# Patient Record
Sex: FEMALE | Race: WHITE | NOT HISPANIC OR LATINO | URBAN - METROPOLITAN AREA
[De-identification: names, ages, dates, MRNs, and addresses within clinical notes are randomized per-mention and may not be internally consistent; named-entity substitution may affect disease eponyms.]

---

## 2017-05-03 ENCOUNTER — OUTPATIENT (OUTPATIENT)
Dept: OUTPATIENT SERVICES | Facility: HOSPITAL | Age: 32
LOS: 1 days | End: 2017-05-03

## 2017-05-16 ENCOUNTER — INPATIENT (INPATIENT)
Facility: HOSPITAL | Age: 32
LOS: 2 days | Discharge: HOME | End: 2017-05-19
Payer: COMMERCIAL

## 2017-05-16 PROCEDURE — 76815 OB US LIMITED FETUS(S): CPT | Mod: 26

## 2018-05-17 PROBLEM — Z00.00 ENCOUNTER FOR PREVENTIVE HEALTH EXAMINATION: Status: ACTIVE | Noted: 2018-05-17

## 2018-05-18 ENCOUNTER — RX RENEWAL (OUTPATIENT)
Age: 33
End: 2018-05-18

## 2018-05-31 ENCOUNTER — RX RENEWAL (OUTPATIENT)
Age: 33
End: 2018-05-31

## 2018-06-01 RX ORDER — DROSPIRENONE AND ETHINYL ESTRADIOL 0.03MG-3MG
3-0.03 KIT ORAL DAILY
Qty: 90 | Refills: 2 | Status: ACTIVE | COMMUNITY
Start: 2018-05-18

## 2019-04-08 ENCOUNTER — APPOINTMENT (OUTPATIENT)
Dept: OBGYN | Facility: CLINIC | Age: 34
End: 2019-04-08
Payer: COMMERCIAL

## 2019-04-08 VITALS
SYSTOLIC BLOOD PRESSURE: 104 MMHG | DIASTOLIC BLOOD PRESSURE: 60 MMHG | HEIGHT: 70.5 IN | BODY MASS INDEX: 22.22 KG/M2 | WEIGHT: 157 LBS

## 2019-04-08 DIAGNOSIS — Z78.9 OTHER SPECIFIED HEALTH STATUS: ICD-10-CM

## 2019-04-08 DIAGNOSIS — Z86.39 PERSONAL HISTORY OF OTHER ENDOCRINE, NUTRITIONAL AND METABOLIC DISEASE: ICD-10-CM

## 2019-04-08 DIAGNOSIS — R53.82 CHRONIC FATIGUE, UNSPECIFIED: ICD-10-CM

## 2019-04-08 DIAGNOSIS — Z87.440 PERSONAL HISTORY OF URINARY (TRACT) INFECTIONS: ICD-10-CM

## 2019-04-08 PROCEDURE — 99395 PREV VISIT EST AGE 18-39: CPT

## 2019-04-08 RX ORDER — DROSPIRENONE AND ETHINYL ESTRADIOL 0.03MG-3MG
KIT ORAL
Refills: 0 | Status: ACTIVE | COMMUNITY

## 2019-04-08 NOTE — DISCUSSION/SUMMARY
[FreeTextEntry1] : a33 white female came to the office for annual exam patient is feeling well physical pelvic exam within normal limits Pap smear done patient is leaning of chronic fatigue with some blood work and managed accordingly I spent a 55 minutes the

## 2019-04-10 LAB
ESTIMATED AVERAGE GLUCOSE: 108 MG/DL
HBA1C MFR BLD HPLC: 5.4 %
HPV HIGH+LOW RISK DNA PNL CVX: NOT DETECTED
TSH SERPL-ACNC: 2.68 UIU/ML

## 2019-04-15 LAB
ANA SER IF-ACNC: NEGATIVE
CYTOLOGY CVX/VAG DOC THIN PREP: NORMAL

## 2019-08-12 ENCOUNTER — RESULT REVIEW (OUTPATIENT)
Age: 34
End: 2019-08-12

## 2020-05-20 ENCOUNTER — APPOINTMENT (OUTPATIENT)
Dept: OBGYN | Facility: CLINIC | Age: 35
End: 2020-05-20

## 2020-06-29 ENCOUNTER — APPOINTMENT (OUTPATIENT)
Dept: OBGYN | Facility: CLINIC | Age: 35
End: 2020-06-29
Payer: COMMERCIAL

## 2020-06-29 VITALS
HEIGHT: 70 IN | BODY MASS INDEX: 22.62 KG/M2 | SYSTOLIC BLOOD PRESSURE: 100 MMHG | DIASTOLIC BLOOD PRESSURE: 70 MMHG | WEIGHT: 158 LBS | TEMPERATURE: 98.7 F

## 2020-06-29 DIAGNOSIS — Z78.9 OTHER SPECIFIED HEALTH STATUS: ICD-10-CM

## 2020-06-29 PROCEDURE — 99395 PREV VISIT EST AGE 18-39: CPT

## 2020-06-29 RX ORDER — DROSPIRENONE AND ETHINYL ESTRADIOL 0.03MG-3MG
3-0.03 KIT ORAL DAILY
Qty: 3 | Refills: 3 | Status: ACTIVE | COMMUNITY
Start: 2019-04-08 | End: 1900-01-01

## 2020-06-29 NOTE — CHIEF COMPLAINT
[FreeTextEntry1] : 34 y/o P1 for annual exam without complaint\par LASt pap was NILM but she wants one annually\par Not sexually active but wants all STI testing\par Wants an ocp refill\par  [Annual Visit] : annual visit

## 2020-07-01 LAB
C TRACH RRNA SPEC QL NAA+PROBE: NOT DETECTED
HBV SURFACE AG SER QL: NONREACTIVE
HCV AB SER QL: NONREACTIVE
HCV S/CO RATIO: 0.09 S/CO
HIV1+2 AB SPEC QL IA.RAPID: NONREACTIVE
HPV HIGH+LOW RISK DNA PNL CVX: NOT DETECTED
N GONORRHOEA RRNA SPEC QL NAA+PROBE: NOT DETECTED
SOURCE TP AMPLIFICATION: NORMAL

## 2020-07-06 LAB — CYTOLOGY CVX/VAG DOC THIN PREP: NORMAL

## 2021-12-29 ENCOUNTER — NON-APPOINTMENT (OUTPATIENT)
Age: 36
End: 2021-12-29

## 2021-12-30 ENCOUNTER — APPOINTMENT (OUTPATIENT)
Dept: OBGYN | Facility: CLINIC | Age: 36
End: 2021-12-30
Payer: COMMERCIAL

## 2021-12-30 ENCOUNTER — LABORATORY RESULT (OUTPATIENT)
Age: 36
End: 2021-12-30

## 2021-12-30 VITALS
DIASTOLIC BLOOD PRESSURE: 84 MMHG | HEIGHT: 70 IN | BODY MASS INDEX: 21.47 KG/M2 | SYSTOLIC BLOOD PRESSURE: 120 MMHG | WEIGHT: 150 LBS

## 2021-12-30 DIAGNOSIS — Z11.3 ENCOUNTER FOR SCREENING FOR INFECTIONS WITH A PREDOMINANTLY SEXUAL MODE OF TRANSMISSION: ICD-10-CM

## 2021-12-30 PROCEDURE — 81025 URINE PREGNANCY TEST: CPT

## 2021-12-30 PROCEDURE — 36415 COLL VENOUS BLD VENIPUNCTURE: CPT

## 2021-12-30 PROCEDURE — 99395 PREV VISIT EST AGE 18-39: CPT

## 2021-12-30 NOTE — DISCUSSION/SUMMARY
[FreeTextEntry1] : ELVIRA GUTIERRES is a 36 year G P here for WWE, normal exam\par - pap/hpv, GC/CH, sti panel sent\par - SBE reviewed\par - Birth Control discussed - keshia sent - no abs c/i\par - Follow up in 1 year for annual or PRN\par

## 2021-12-30 NOTE — HISTORY OF PRESENT ILLNESS
[FreeTextEntry1] : 36 yr old  here for annual also requesting ocps and sti screening. Iris has no symptoms but would like screening every year and pap smears every year. Denies vb pain or discharge. sexually active, using condoms right now but desires to go back on keshia. \par \par gynhx: reg menses; denies sti/fibroids/cysts; remote h/o abn pap, most recent in  - nilm, hpv neg\par obhx:  x1, 10 lb, [facility]

## 2022-01-02 LAB
C TRACH RRNA SPEC QL NAA+PROBE: NOT DETECTED
HBV SURFACE AG SER QL: NONREACTIVE
HCV AB SER QL: NONREACTIVE
HCV S/CO RATIO: 0.09 S/CO
HIV1+2 AB SPEC QL IA.RAPID: NONREACTIVE
HPV HIGH+LOW RISK DNA PNL CVX: NOT DETECTED
HSV 1+2 IGG SER IA-IMP: NEGATIVE
HSV 1+2 IGG SER IA-IMP: NEGATIVE
HSV1 IGG SER QL: 0.04 INDEX
HSV2 IGG SER QL: 0.15 INDEX
N GONORRHOEA RRNA SPEC QL NAA+PROBE: NOT DETECTED
SOURCE TP AMPLIFICATION: NORMAL
T PALLIDUM AB SER QL IA: NEGATIVE

## 2022-01-14 LAB
CYTOLOGY CVX/VAG DOC THIN PREP: ABNORMAL
HSV1 IGM SER QL: NEGATIVE
HSV2 AB FLD-ACNC: NEGATIVE

## 2023-01-19 ENCOUNTER — RX RENEWAL (OUTPATIENT)
Age: 38
End: 2023-01-19

## 2023-02-09 ENCOUNTER — APPOINTMENT (OUTPATIENT)
Dept: OBGYN | Facility: CLINIC | Age: 38
End: 2023-02-09
Payer: COMMERCIAL

## 2023-02-09 VITALS
SYSTOLIC BLOOD PRESSURE: 125 MMHG | BODY MASS INDEX: 22.48 KG/M2 | HEIGHT: 70 IN | WEIGHT: 157 LBS | DIASTOLIC BLOOD PRESSURE: 73 MMHG

## 2023-02-09 DIAGNOSIS — Z01.419 ENCOUNTER FOR GYNECOLOGICAL EXAMINATION (GENERAL) (ROUTINE) W/OUT ABNORMAL FINDINGS: ICD-10-CM

## 2023-02-09 PROCEDURE — 99395 PREV VISIT EST AGE 18-39: CPT

## 2023-02-09 PROCEDURE — 36415 COLL VENOUS BLD VENIPUNCTURE: CPT

## 2023-02-09 NOTE — DISCUSSION/SUMMARY
[FreeTextEntry1] : ELVIRA GUTIERRES is a 36 year  here for WWE, normal exam\par - pap/hpv, GC/CH, sti panel sent - desires every year\par - SBE reviewed\par - Birth Control discussed - keshia refill sent - no abs c/i\par - Follow up in 1 year for annual or PRN\par - tsh sent - h/o hypothyroid

## 2023-02-09 NOTE — HISTORY OF PRESENT ILLNESS
[FreeTextEntry1] : 37 yr old  here for annual also requesting ocps and sti screening. Iris has no symptoms but would like screening every year and pap smears every year. Denies vb pain or discharge. sexually active.\par \par bc: keshia\par \par gynhx: reg menses; denies sti/fibroids/cysts; remote h/o abn pap, most recent in  - nilm, hpv neg\par obhx:  x1, 10 lb, [facility]

## 2023-02-13 ENCOUNTER — TRANSCRIPTION ENCOUNTER (OUTPATIENT)
Age: 38
End: 2023-02-13

## 2023-02-13 LAB
C TRACH RRNA SPEC QL NAA+PROBE: NOT DETECTED
HBV SURFACE AG SER QL: NONREACTIVE
HCV AB SER QL: NONREACTIVE
HCV S/CO RATIO: 0.06 S/CO
HIV1+2 AB SPEC QL IA.RAPID: NONREACTIVE
HPV HIGH+LOW RISK DNA PNL CVX: NOT DETECTED
N GONORRHOEA RRNA SPEC QL NAA+PROBE: NOT DETECTED
SOURCE TP AMPLIFICATION: NORMAL
T PALLIDUM AB SER QL IA: NEGATIVE
TSH SERPL-ACNC: 1.93 UIU/ML

## 2023-02-15 LAB — CYTOLOGY CVX/VAG DOC THIN PREP: NORMAL

## 2023-02-16 ENCOUNTER — TRANSCRIPTION ENCOUNTER (OUTPATIENT)
Age: 38
End: 2023-02-16

## 2024-03-27 ENCOUNTER — APPOINTMENT (OUTPATIENT)
Dept: OBGYN | Facility: CLINIC | Age: 39
End: 2024-03-27
Payer: COMMERCIAL

## 2024-03-27 VITALS
HEIGHT: 70 IN | SYSTOLIC BLOOD PRESSURE: 110 MMHG | DIASTOLIC BLOOD PRESSURE: 73 MMHG | BODY MASS INDEX: 22.9 KG/M2 | WEIGHT: 160 LBS

## 2024-03-27 LAB
HCG UR QL: NEGATIVE
QUALITY CONTROL: YES

## 2024-03-27 PROCEDURE — 99395 PREV VISIT EST AGE 18-39: CPT

## 2024-03-27 PROCEDURE — 81025 URINE PREGNANCY TEST: CPT

## 2024-03-27 NOTE — HISTORY OF PRESENT ILLNESS
[Patient reported PAP Smear was normal] : Patient reported PAP Smear was normal [HIV test declined] : HIV test declined [Syphilis test declined] : Syphilis test declined [Gonorrhea test declined] : Gonorrhea test declined [Chlamydia test declined] : Chlamydia test declined [Trichomonas test declined] : Trichomonas test declined [HPV test offered] : HPV test offered [Hepatitis B test declined] : Hepatitis B test declined [Hepatitis C test declined] : Hepatitis C test declined [N] : Patient reports normal menses [Oral Contraceptive] : uses oral contraception pills [Y] : Patient is sexually active [TextBox_4] : 38 yr old  here for annual also requesting ocps refill.  She reports no adverse effects to OCPs.  Declines STI screening today.Denies vb pain or discharge. sexually active. [PapSmeardate] : 2/23 [LMPDate] : 2/22/24 [PGxTotal] : 1 [[facility] HospitalxFulerm] : 1 [[facility] Surpriseiving] : 1

## 2024-03-27 NOTE — PLAN
[FreeTextEntry1] : Unremarkable CBE and pelvic exam SBE reviewed Mammo ordered Pap and HPV collected Healthy diet, exercise, sleep hygiene discussed OCPs renewed Patient to contact provider with any of the following warning signs of hormonal contraceptives: Abdominal pain CHest pain Headaches Visual Changes Shortness of breath Pain or swelling in limbs No other gyn concerns today RTO x 1 year or prn

## 2024-03-28 LAB — HPV HIGH+LOW RISK DNA PNL CVX: NOT DETECTED

## 2024-04-01 LAB — CYTOLOGY CVX/VAG DOC THIN PREP: NORMAL

## 2024-04-12 ENCOUNTER — APPOINTMENT (OUTPATIENT)
Dept: MAMMOGRAPHY | Facility: CLINIC | Age: 39
End: 2024-04-12
Payer: COMMERCIAL

## 2024-04-12 ENCOUNTER — RESULT REVIEW (OUTPATIENT)
Age: 39
End: 2024-04-12

## 2024-04-12 PROCEDURE — 77067 SCR MAMMO BI INCL CAD: CPT

## 2024-04-12 PROCEDURE — 77063 BREAST TOMOSYNTHESIS BI: CPT

## 2024-04-14 DIAGNOSIS — Z87.898 PERSONAL HISTORY OF OTHER SPECIFIED CONDITIONS: ICD-10-CM

## 2024-05-02 ENCOUNTER — APPOINTMENT (OUTPATIENT)
Dept: ULTRASOUND IMAGING | Facility: CLINIC | Age: 39
End: 2024-05-02

## 2024-05-02 ENCOUNTER — APPOINTMENT (OUTPATIENT)
Dept: MAMMOGRAPHY | Facility: CLINIC | Age: 39
End: 2024-05-02
Payer: COMMERCIAL

## 2024-05-02 ENCOUNTER — RESULT REVIEW (OUTPATIENT)
Age: 39
End: 2024-05-02

## 2024-05-02 PROCEDURE — 77065 DX MAMMO INCL CAD UNI: CPT | Mod: LT

## 2024-05-02 PROCEDURE — G0279: CPT | Mod: LT

## 2024-05-02 PROCEDURE — 76642 ULTRASOUND BREAST LIMITED: CPT | Mod: 50

## 2024-05-06 RX ORDER — DROSPIRENONE AND ETHINYL ESTRADIOL 0.03MG-3MG
3-0.03 KIT ORAL DAILY
Qty: 3 | Refills: 3 | Status: ACTIVE | COMMUNITY
Start: 2021-12-30

## 2025-01-16 ENCOUNTER — NON-APPOINTMENT (OUTPATIENT)
Age: 40
End: 2025-01-16

## 2025-04-28 ENCOUNTER — LABORATORY RESULT (OUTPATIENT)
Age: 40
End: 2025-04-28

## 2025-04-28 ENCOUNTER — APPOINTMENT (OUTPATIENT)
Dept: OBGYN | Facility: CLINIC | Age: 40
End: 2025-04-28
Payer: COMMERCIAL

## 2025-04-28 VITALS
SYSTOLIC BLOOD PRESSURE: 101 MMHG | WEIGHT: 160 LBS | HEIGHT: 70 IN | BODY MASS INDEX: 22.9 KG/M2 | DIASTOLIC BLOOD PRESSURE: 66 MMHG

## 2025-04-28 DIAGNOSIS — Z01.419 ENCOUNTER FOR GYNECOLOGICAL EXAMINATION (GENERAL) (ROUTINE) W/OUT ABNORMAL FINDINGS: ICD-10-CM

## 2025-04-28 PROCEDURE — 99396 PREV VISIT EST AGE 40-64: CPT

## 2025-04-30 LAB — HPV HIGH+LOW RISK DNA PNL CVX: DETECTED

## 2025-05-05 LAB — CYTOLOGY CVX/VAG DOC THIN PREP: ABNORMAL

## 2025-05-14 ENCOUNTER — RESULT REVIEW (OUTPATIENT)
Age: 40
End: 2025-05-14

## 2025-05-14 ENCOUNTER — APPOINTMENT (OUTPATIENT)
Dept: MAMMOGRAPHY | Facility: CLINIC | Age: 40
End: 2025-05-14
Payer: COMMERCIAL

## 2025-05-14 PROCEDURE — G0279: CPT

## 2025-05-14 PROCEDURE — 77066 DX MAMMO INCL CAD BI: CPT
